# Patient Record
(demographics unavailable — no encounter records)

---

## 2017-05-10 NOTE — REPMRS
Patient History

The patient states she had a clinical breast exam in 01/2017.

Patient is postmenopausal.

No known family history of cancer.

 

Digital Woman Screen Mammo: May 10, 2017 - Exam #: 

[id number]

Bilateral CC and MLO view(s) were taken.

 

Technologist: Chuyita Charles, Technologist

Prior study comparison: June 30, 2016, digital woman screen mammo

performed at [facility].  June 25, 2015, digital 

woman screen mammo performed at [facility].

 

FINDINGS: The breast tissue is heterogeneously dense.  This may 

lower the sensitivity of mammography.  There has been no change 

in the appearance of the mammogram from the prior studies.  There

is a moderate amount of residual fibroglandular tissue which is 

fairly symmetric. There is no interval development of dominant 

mass, areas of architectural distortion, or clustered 

microcalcification typical of malignancy.

 

ASSESSMENT: BI-RADS/ACR category 1 mammogram. Negative.

 

Recommendation

Routine screening mammogram in 1 year (for women over age 40).

This mammogram was interpreted with the aid of an FDA-approved 

computer-aided dectection system.

 

Electronically Signed By: Blane Landeros MD 05/10/17 [id number]